# Patient Record
Sex: FEMALE | Race: WHITE | NOT HISPANIC OR LATINO | Employment: FULL TIME | URBAN - METROPOLITAN AREA
[De-identification: names, ages, dates, MRNs, and addresses within clinical notes are randomized per-mention and may not be internally consistent; named-entity substitution may affect disease eponyms.]

---

## 2023-04-10 ENCOUNTER — OFFICE VISIT (OUTPATIENT)
Dept: PRIMARY CARE | Facility: CLINIC | Age: 23
End: 2023-04-10
Payer: COMMERCIAL

## 2023-04-10 VITALS
HEIGHT: 64 IN | TEMPERATURE: 99.5 F | WEIGHT: 128 LBS | SYSTOLIC BLOOD PRESSURE: 130 MMHG | HEART RATE: 80 BPM | DIASTOLIC BLOOD PRESSURE: 70 MMHG | BODY MASS INDEX: 21.85 KG/M2 | RESPIRATION RATE: 16 BRPM

## 2023-04-10 DIAGNOSIS — Z00.00 ROUTINE GENERAL MEDICAL EXAMINATION AT A HEALTH CARE FACILITY: Primary | ICD-10-CM

## 2023-04-10 DIAGNOSIS — Z01.419 PAP SMEAR, AS PART OF ROUTINE GYNECOLOGICAL EXAMINATION: ICD-10-CM

## 2023-04-10 PROBLEM — B37.31 VAGINITIS DUE TO CANDIDA ALBICANS: Status: ACTIVE | Noted: 2023-04-10

## 2023-04-10 PROBLEM — H10.9 CONJUNCTIVITIS: Status: RESOLVED | Noted: 2023-04-10 | Resolved: 2023-04-10

## 2023-04-10 PROBLEM — L20.9 ATOPIC DERMATITIS: Status: ACTIVE | Noted: 2023-04-10

## 2023-04-10 PROBLEM — J30.9 ALLERGIC RHINITIS: Status: RESOLVED | Noted: 2023-04-10 | Resolved: 2023-04-10

## 2023-04-10 PROBLEM — H57.9 EYE PROBLEM: Status: ACTIVE | Noted: 2023-04-10

## 2023-04-10 PROBLEM — L30.9 ECZEMA: Status: ACTIVE | Noted: 2023-04-10

## 2023-04-10 PROBLEM — N92.1 MENORRHAGIA WITH IRREGULAR CYCLE: Status: ACTIVE | Noted: 2023-04-10

## 2023-04-10 PROBLEM — L20.9 ATOPIC DERMATITIS: Status: RESOLVED | Noted: 2023-04-10 | Resolved: 2023-04-10

## 2023-04-10 PROBLEM — J02.9 PHARYNGITIS: Status: ACTIVE | Noted: 2023-04-10

## 2023-04-10 PROBLEM — H66.92 LEFT OTITIS MEDIA: Status: ACTIVE | Noted: 2023-04-10

## 2023-04-10 PROBLEM — K21.9 GERD (GASTROESOPHAGEAL REFLUX DISEASE): Status: ACTIVE | Noted: 2023-04-10

## 2023-04-10 PROBLEM — H10.9 CONJUNCTIVITIS: Status: ACTIVE | Noted: 2023-04-10

## 2023-04-10 PROBLEM — J30.9 ALLERGIC RHINITIS: Status: ACTIVE | Noted: 2023-04-10

## 2023-04-10 PROBLEM — J02.9 PHARYNGITIS: Status: RESOLVED | Noted: 2023-04-10 | Resolved: 2023-04-10

## 2023-04-10 PROBLEM — L30.9 DERMATITIS: Status: ACTIVE | Noted: 2023-04-10

## 2023-04-10 PROBLEM — L30.9 DERMATITIS: Status: RESOLVED | Noted: 2023-04-10 | Resolved: 2023-04-10

## 2023-04-10 PROBLEM — J02.9 SORE THROAT: Status: ACTIVE | Noted: 2023-04-10

## 2023-04-10 PROBLEM — H57.9 EYE PROBLEM: Status: RESOLVED | Noted: 2023-04-10 | Resolved: 2023-04-10

## 2023-04-10 PROBLEM — H66.92 LEFT OTITIS MEDIA: Status: RESOLVED | Noted: 2023-04-10 | Resolved: 2023-04-10

## 2023-04-10 PROBLEM — B37.31 VAGINITIS DUE TO CANDIDA ALBICANS: Status: RESOLVED | Noted: 2023-04-10 | Resolved: 2023-04-10

## 2023-04-10 PROBLEM — J02.9 SORE THROAT: Status: RESOLVED | Noted: 2023-04-10 | Resolved: 2023-04-10

## 2023-04-10 PROCEDURE — 99395 PREV VISIT EST AGE 18-39: CPT | Performed by: INTERNAL MEDICINE

## 2023-04-10 PROCEDURE — 1036F TOBACCO NON-USER: CPT | Performed by: INTERNAL MEDICINE

## 2023-04-10 PROCEDURE — 87624 HPV HI-RISK TYP POOLED RSLT: CPT

## 2023-04-10 PROCEDURE — 88175 CYTOPATH C/V AUTO FLUID REDO: CPT

## 2023-04-10 RX ORDER — CETIRIZINE HYDROCHLORIDE 10 MG/1
1 TABLET ORAL DAILY
COMMUNITY
Start: 2022-04-11

## 2023-04-10 RX ORDER — NORGESTIMATE AND ETHINYL ESTRADIOL 7DAYSX3 28
1 KIT ORAL DAILY
COMMUNITY
End: 2023-04-10 | Stop reason: SDUPTHER

## 2023-04-10 RX ORDER — FLUTICASONE PROPIONATE 50 MCG
1 SPRAY, SUSPENSION (ML) NASAL DAILY
COMMUNITY

## 2023-04-10 RX ORDER — NORGESTIMATE AND ETHINYL ESTRADIOL 7DAYSX3 28
1 KIT ORAL DAILY
Qty: 84 TABLET | Refills: 3 | Status: SHIPPED | OUTPATIENT
Start: 2023-04-10 | End: 2024-04-25 | Stop reason: SDUPTHER

## 2023-04-10 ASSESSMENT — COLUMBIA-SUICIDE SEVERITY RATING SCALE - C-SSRS
1. IN THE PAST MONTH, HAVE YOU WISHED YOU WERE DEAD OR WISHED YOU COULD GO TO SLEEP AND NOT WAKE UP?: NO
6. HAVE YOU EVER DONE ANYTHING, STARTED TO DO ANYTHING, OR PREPARED TO DO ANYTHING TO END YOUR LIFE?: NO
2. HAVE YOU ACTUALLY HAD ANY THOUGHTS OF KILLING YOURSELF?: NO

## 2023-04-10 ASSESSMENT — ENCOUNTER SYMPTOMS: DEPRESSION: 0

## 2023-04-10 ASSESSMENT — PATIENT HEALTH QUESTIONNAIRE - PHQ9
2. FEELING DOWN, DEPRESSED OR HOPELESS: NOT AT ALL
SUM OF ALL RESPONSES TO PHQ9 QUESTIONS 1 AND 2: 0
1. LITTLE INTEREST OR PLEASURE IN DOING THINGS: NOT AT ALL

## 2023-04-10 NOTE — PROGRESS NOTES
Subjective   Patient ID: Shelli Winn is a 23 y.o. female who presents for Annual Exam (With pap ).  HPI      Shelli Winn is here for annual check-up.      Reported Health good    Dental visits utd  Vision checks utd    Diet healthy  Exercise some   Caffeine weaning   Tobacco  in college   Alcohol   rarwe    Female : Menstrual status /pregnancy status  no issue    Colorectal cancer screening  na    Current issues presents today for physical.  She needs a Pap she has never had 1 foot before.  She has no current issues.  She is feeling well and needs refill on her birth control.  Stop it    Review of Systems  GENERAL - Denies fever, fatigue or chills  SKIN - Denies rash, new skin lesions, or change in moles  EYES - Denies blurred vision, or change in visual acuity  EARS - Denies ear pain, discharge, ringing, or difficulty hearing  NOSE - Denies nasal congestion, discharge, or bleeding  MOUTH - Denies sore throat, postnasal drip or painful/difficulty swallowing  NECK - Denies pain or swelling  RESPIRATORY - Denies shortness of breath, cough, wheezing  CARDIOVASCULAR - Denies palpitations, chest pain, orthopnea, peripheral edema, syncope or claudication  GASTROINTESTINAL - Denies nausea, vomiting, diarrhea, constipation, abdominal pain, melena and or bright red blood  GENITOURINARY - Denies dysuria, frequency of urination, urgency, or hesitancy  MUSCULOSKELETAL - Denies joint or muscle pain, or back pain  NEUROLOGICAL - Denies localized numbness, weakness, or tingling  PSYCHIATRIC - Denies depression, anxiety, substance abuse, suicidal or homicidal ideation  ENDOCRINE - Denies heat or cold intolerance, weight loss or gain, increasing thirst  HEMATO-IMMUNOLOGIC - Denies easy bruising, bleeding, oral ulcerations or recurrent infections      Objective   Vitals:    04/10/23 1556   BP: 130/70   Pulse: 80   Resp: 16   Temp: 37.5 °C (99.5 °F)      Physical Exam  CONSTITUTIONAL - well nourished, well developed, looks  like stated age, in no acute distress, not ill-appearing, and not tired appearing  SKIN - normal skin color and pigmentation, normal skin turgor without rash, lesions, or nodules visualized  HEAD - no trauma, normocephalic  EYES -normal  ENT - TM's intact, no injection, no exudate,  nasal passage without discharge and patent  NECK - supple without rigidity, no neck mass was observed, no thyromegaly or thyroid nodules  Breast normal bilaterally no masses  CHEST - clear to auscultation, no wheezing, no crackles and no rales, good effort  CARDIAC - regular rate and regular rhythm, no skipped beats, no murmur  ABDOMEN - no organomegaly, soft, nontender, nondistended, normal bowel sounds, no guarding/rebound/rigidity    normal external/internal genitalia vagina is normal cervix is normal bimanual exam normal  EXTREMITIES - no edema, no deformities  NEUROLOGICAL -normal  PSYCHIATRIC - alert, pleasant and cordial, age-appropriate  LYMPHATIC- no cervical lymphadenopathy    Assessment/Plan   Diagnoses and all orders for this visit:  Routine general medical examination at a health care facility  -     norgestimate-ethinyl estradioL (Ortho Tri-Cyclen,Trinessa) 0.18/0.215/0.25 mg-35 mcg (28) tablet; Take 1 tablet by mouth once daily.  Pap smear, as part of routine gynecological examination  -     THINPREP PAP TEST    Follow-up with me for annual check  If she needs medication prior she will call  Keila Condon MD

## 2023-04-18 LAB
COMPLETE PATHOLOGY REPORT: NORMAL
CONVERTED CLINICAL DIAGNOSIS-HISTORY: NORMAL
CONVERTED DIAGNOSIS COMMENT: NORMAL
CONVERTED FINAL DIAGNOSIS: NORMAL
CONVERTED FINAL REPORT PDF LINK TO COPY AND PASTE: NORMAL

## 2023-05-21 DIAGNOSIS — Z00.00 ROUTINE GENERAL MEDICAL EXAMINATION AT A HEALTH CARE FACILITY: ICD-10-CM

## 2023-05-24 RX ORDER — NORGESTIMATE AND ETHINYL ESTRADIOL 7DAYSX3 28
KIT ORAL
Qty: 84 TABLET | Refills: 3 | OUTPATIENT
Start: 2023-05-24

## 2024-04-25 DIAGNOSIS — Z00.00 ROUTINE GENERAL MEDICAL EXAMINATION AT A HEALTH CARE FACILITY: ICD-10-CM

## 2024-04-25 NOTE — TELEPHONE ENCOUNTER
Spoke w/patient, needs refill on  Birth Control  Send to [facility] [place]  Last appt. 4/10/23  Upcoming appt. 8/16/24

## 2024-04-26 RX ORDER — NORGESTIMATE AND ETHINYL ESTRADIOL 7DAYSX3 28
1 KIT ORAL DAILY
Qty: 84 TABLET | Refills: 1 | Status: SHIPPED | OUTPATIENT
Start: 2024-04-26

## 2024-07-31 DIAGNOSIS — N92.1 MENORRHAGIA WITH IRREGULAR CYCLE: ICD-10-CM

## 2024-07-31 DIAGNOSIS — L30.9 ECZEMA, UNSPECIFIED TYPE: ICD-10-CM

## 2024-07-31 DIAGNOSIS — Z00.00 ROUTINE GENERAL MEDICAL EXAMINATION AT A HEALTH CARE FACILITY: ICD-10-CM

## 2024-07-31 DIAGNOSIS — K21.9 GASTROESOPHAGEAL REFLUX DISEASE, UNSPECIFIED WHETHER ESOPHAGITIS PRESENT: ICD-10-CM

## 2024-08-15 DIAGNOSIS — Z00.00 ROUTINE GENERAL MEDICAL EXAMINATION AT A HEALTH CARE FACILITY: ICD-10-CM

## 2024-08-15 RX ORDER — NORGESTIMATE AND ETHINYL ESTRADIOL 7DAYSX3 28
1 KIT ORAL DAILY
Qty: 84 TABLET | Refills: 1 | Status: SHIPPED | OUTPATIENT
Start: 2024-08-15

## 2024-08-15 NOTE — TELEPHONE ENCOUNTER
Pt called in requesting refill for   Birth control  [facility]   Last appt: 4/10/23  Next appt: 12/9/24

## 2024-08-16 ENCOUNTER — APPOINTMENT (OUTPATIENT)
Dept: PRIMARY CARE | Facility: CLINIC | Age: 24
End: 2024-08-16
Payer: COMMERCIAL

## 2024-10-31 DIAGNOSIS — Z00.00 ROUTINE GENERAL MEDICAL EXAMINATION AT A HEALTH CARE FACILITY: ICD-10-CM

## 2024-11-01 RX ORDER — NORGESTIMATE AND ETHINYL ESTRADIOL 7DAYSX3 28
1 KIT ORAL DAILY
Qty: 84 TABLET | Refills: 0 | Status: SHIPPED | OUTPATIENT
Start: 2024-11-01

## 2024-12-05 ENCOUNTER — TELEPHONE (OUTPATIENT)
Dept: PRIMARY CARE | Facility: CLINIC | Age: 24
End: 2024-12-05
Payer: COMMERCIAL

## 2024-12-05 NOTE — TELEPHONE ENCOUNTER
Spoke w/patient, needs refill on  Birth Control  Send to [facility], [place], [place]  Last appt. 4/10/23  Upcoming appt. - Cancelled 2 appts.    *patient states she has an appt. in [place] 3/18/25 & only has enough Birth Control for 1 1/2 months*

## 2024-12-06 DIAGNOSIS — Z00.00 ROUTINE GENERAL MEDICAL EXAMINATION AT A HEALTH CARE FACILITY: ICD-10-CM

## 2024-12-06 RX ORDER — NORGESTIMATE AND ETHINYL ESTRADIOL 7DAYSX3 28
1 KIT ORAL DAILY
Qty: 84 TABLET | Refills: 0 | Status: SHIPPED | OUTPATIENT
Start: 2024-12-06

## 2024-12-09 ENCOUNTER — APPOINTMENT (OUTPATIENT)
Dept: PRIMARY CARE | Facility: CLINIC | Age: 24
End: 2024-12-09
Payer: COMMERCIAL